# Patient Record
Sex: MALE | Race: WHITE | NOT HISPANIC OR LATINO
[De-identification: names, ages, dates, MRNs, and addresses within clinical notes are randomized per-mention and may not be internally consistent; named-entity substitution may affect disease eponyms.]

---

## 2020-09-22 PROBLEM — Z00.00 ENCOUNTER FOR PREVENTIVE HEALTH EXAMINATION: Status: ACTIVE | Noted: 2020-09-22

## 2020-09-24 DIAGNOSIS — Z01.818 ENCOUNTER FOR OTHER PREPROCEDURAL EXAMINATION: ICD-10-CM

## 2020-09-25 ENCOUNTER — APPOINTMENT (OUTPATIENT)
Dept: DISASTER EMERGENCY | Facility: CLINIC | Age: 45
End: 2020-09-25

## 2020-09-26 LAB — SARS-COV-2 N GENE NPH QL NAA+PROBE: NOT DETECTED

## 2020-09-29 ENCOUNTER — OUTPATIENT (OUTPATIENT)
Dept: OUTPATIENT SERVICES | Facility: HOSPITAL | Age: 45
LOS: 1 days | End: 2020-09-29

## 2021-07-13 ENCOUNTER — OUTPATIENT (OUTPATIENT)
Dept: OUTPATIENT SERVICES | Facility: HOSPITAL | Age: 46
LOS: 1 days | End: 2021-07-13

## 2021-09-16 ENCOUNTER — FORM ENCOUNTER (OUTPATIENT)
Age: 46
End: 2021-09-16

## 2021-09-17 ENCOUNTER — TRANSCRIPTION ENCOUNTER (OUTPATIENT)
Age: 46
End: 2021-09-17

## 2021-09-19 ENCOUNTER — OUTPATIENT (OUTPATIENT)
Dept: OUTPATIENT SERVICES | Facility: HOSPITAL | Age: 46
LOS: 1 days | End: 2021-09-19
Payer: COMMERCIAL

## 2021-09-19 ENCOUNTER — APPOINTMENT (OUTPATIENT)
Dept: DISASTER EMERGENCY | Facility: HOSPITAL | Age: 46
End: 2021-09-19

## 2021-09-19 VITALS
OXYGEN SATURATION: 98 % | WEIGHT: 225.09 LBS | DIASTOLIC BLOOD PRESSURE: 90 MMHG | RESPIRATION RATE: 16 BRPM | SYSTOLIC BLOOD PRESSURE: 145 MMHG | HEART RATE: 73 BPM | HEIGHT: 66 IN | TEMPERATURE: 99 F

## 2021-09-19 VITALS
RESPIRATION RATE: 16 BRPM | OXYGEN SATURATION: 98 % | DIASTOLIC BLOOD PRESSURE: 97 MMHG | TEMPERATURE: 98 F | SYSTOLIC BLOOD PRESSURE: 134 MMHG | HEART RATE: 56 BPM

## 2021-09-19 DIAGNOSIS — U07.1 COVID-19: ICD-10-CM

## 2021-09-19 PROCEDURE — M0243: CPT

## 2021-09-19 RX ORDER — SODIUM CHLORIDE 9 MG/ML
250 INJECTION INTRAMUSCULAR; INTRAVENOUS; SUBCUTANEOUS
Refills: 0 | Status: COMPLETED | OUTPATIENT
Start: 2021-09-19 | End: 2021-09-19

## 2021-09-19 RX ADMIN — SODIUM CHLORIDE 310 MILLILITER(S): 9 INJECTION INTRAMUSCULAR; INTRAVENOUS; SUBCUTANEOUS at 08:49

## 2021-09-19 NOTE — CHART NOTE - NSCHARTNOTEFT_GEN_A_CORE
CC: Monoclonal Antibody Infusions /COVID 19 Positive      History: Patient presents for infusion of monoclonal antibody infusion. Patient has been screened and was deemed to be a candidate.    Symptoms/ Criteria: + covid    Risk Profile includes: asthma htn    casirivimab/imdevimab in sodium chloride 0.9% (EUA) IVPB 100 milliLiter(s) IV Intermittent once  sodium chloride 0.9%. 250 milliLiter(s) IV Continuous <Continuous>      PMHx:  Infection due to severe acute respiratory syndrome coronavirus 2 (SARS-CoV-2)          T(C): 37.4 (09-19-21 @ 07:49), Max: 37.4 (09-19-21 @ 07:49)  HR: 73 (09-19-21 @ 07:49) (73 - 73)  BP: 145/90 (09-19-21 @ 07:49) (145/90 - 145/90)  RR: 16 (09-19-21 @ 07:49) (16 - 16)  SpO2: 98% (09-19-21 @ 07:49) (98% - 98%)      PE:   Appearance: NAD	  HEENT:   Normal oral mucosa.   Lymphatic: No lymphadenopathy  Cardiovascular: Normal S1 S2, No JVD, No murmurs, No edema  Respiratory: Lungs clear to auscultation	  Gastrointestinal:  Soft, Non-tender. No guarding   Skin: warm and dry  Neurologic: Non-focal  Extremities: Normal range of motion.     ASSESSMENT:  Pt is a 46y year old Male Covid +  referred to the infusion center for Monoclonal antibody infusion (Regeneron).        PLAN:  - infusion procedure explained to patient   - Consent for monoclonal antibody infusion obtained   - Risk & benefits discussed/all questions answered  - infuse Regeneron over one hour   - will observe patient for one hour post infusion  and then if stable discharge home with outpt follow up as planned by PMD.    POST INFUSION ASSESSMENT:   DISCHARGE at approximately  XXX  hours    - Patient tolerated infusion well denies complaints of chest pain/SOB/dizziness/ palps  - VSS for discharge home  - D/C instructions given/ fact sheet included.  - Patient to follow-up with PCP as needed. CC: Monoclonal Antibody Infusions /COVID 19 Positive      History: Patient presents for infusion of monoclonal antibody infusion. Patient has been screened and was deemed to be a candidate.    Symptoms/ Criteria: + covid    Risk Profile includes: asthma htn    casirivimab/imdevimab in sodium chloride 0.9% (EUA) IVPB 100 milliLiter(s) IV Intermittent once  sodium chloride 0.9%. 250 milliLiter(s) IV Continuous <Continuous>      PMHx:  Infection due to severe acute respiratory syndrome coronavirus 2 (SARS-CoV-2)          T(C): 37.4 (09-19-21 @ 07:49), Max: 37.4 (09-19-21 @ 07:49)  HR: 73 (09-19-21 @ 07:49) (73 - 73)  BP: 145/90 (09-19-21 @ 07:49) (145/90 - 145/90)  RR: 16 (09-19-21 @ 07:49) (16 - 16)  SpO2: 98% (09-19-21 @ 07:49) (98% - 98%)      PE:   Appearance: NAD	  HEENT:   Normal oral mucosa.   Lymphatic: No lymphadenopathy  Cardiovascular: Normal S1 S2, No JVD, No murmurs, No edema  Respiratory: Lungs clear to auscultation	  Gastrointestinal:  Soft, Non-tender. No guarding   Skin: warm and dry  Neurologic: Non-focal  Extremities: Normal range of motion.     ASSESSMENT:  Pt is a 46y year old Male Covid +  referred to the infusion center for Monoclonal antibody infusion (Regeneron).        PLAN:  - infusion procedure explained to patient   - Consent for monoclonal antibody infusion obtained   - Risk & benefits discussed/all questions answered  - infuse Regeneron over one hour   - will observe patient for one hour post infusion  and then if stable discharge home with outpt follow up as planned by PMD.    POST INFUSION ASSESSMENT:   DISCHARGE at approximately  1010a  hours    - Patient tolerated infusion well denies complaints of chest pain/SOB/dizziness/ palps  - VSS for discharge home  - D/C instructions given/ fact sheet included.  - Patient to follow-up with PCP as needed.

## 2021-09-21 ENCOUNTER — TRANSCRIPTION ENCOUNTER (OUTPATIENT)
Age: 46
End: 2021-09-21

## 2022-10-09 ENCOUNTER — TRANSCRIPTION ENCOUNTER (OUTPATIENT)
Age: 47
End: 2022-10-09

## 2023-02-10 ENCOUNTER — NON-APPOINTMENT (OUTPATIENT)
Age: 48
End: 2023-02-10

## 2024-02-29 ENCOUNTER — APPOINTMENT (OUTPATIENT)
Dept: CARDIOLOGY | Facility: CLINIC | Age: 49
End: 2024-02-29
Payer: COMMERCIAL

## 2024-02-29 ENCOUNTER — NON-APPOINTMENT (OUTPATIENT)
Age: 49
End: 2024-02-29

## 2024-02-29 VITALS — DIASTOLIC BLOOD PRESSURE: 80 MMHG | SYSTOLIC BLOOD PRESSURE: 126 MMHG

## 2024-02-29 VITALS
WEIGHT: 193 LBS | DIASTOLIC BLOOD PRESSURE: 84 MMHG | BODY MASS INDEX: 31.02 KG/M2 | HEART RATE: 76 BPM | SYSTOLIC BLOOD PRESSURE: 120 MMHG | HEIGHT: 66 IN

## 2024-02-29 DIAGNOSIS — Z78.9 OTHER SPECIFIED HEALTH STATUS: ICD-10-CM

## 2024-02-29 DIAGNOSIS — Z86.69 PERSONAL HISTORY OF OTHER DISEASES OF THE NERVOUS SYSTEM AND SENSE ORGANS: ICD-10-CM

## 2024-02-29 DIAGNOSIS — Z13.6 ENCOUNTER FOR SCREENING FOR CARDIOVASCULAR DISORDERS: ICD-10-CM

## 2024-02-29 DIAGNOSIS — K21.9 GASTRO-ESOPHAGEAL REFLUX DISEASE W/OUT ESOPHAGITIS: ICD-10-CM

## 2024-02-29 DIAGNOSIS — I10 ESSENTIAL (PRIMARY) HYPERTENSION: ICD-10-CM

## 2024-02-29 PROCEDURE — 93000 ELECTROCARDIOGRAM COMPLETE: CPT

## 2024-02-29 PROCEDURE — 99203 OFFICE O/P NEW LOW 30 MIN: CPT

## 2024-02-29 RX ORDER — OMEPRAZOLE 20 MG/1
20 CAPSULE, DELAYED RELEASE ORAL
Qty: 30 | Refills: 0 | Status: ACTIVE | COMMUNITY

## 2024-02-29 RX ORDER — METOPROLOL SUCCINATE 100 MG/1
100 TABLET, EXTENDED RELEASE ORAL
Qty: 90 | Refills: 3 | Status: ACTIVE | COMMUNITY

## 2024-02-29 NOTE — PHYSICAL EXAM
[Normal] : moves all extremities, no focal deficits, normal speech [de-identified] :  No carotid bruits auscultated bilaterally.

## 2024-02-29 NOTE — HISTORY OF PRESENT ILLNESS
[FreeTextEntry1] : 49 year old male with PMHx of HTN presents for a cardiac evaluation out of the recommendation of his wife. Patient with no chest pain, dyspnea or palpitations. Patient has been on Toprol XL 100mg daily for about  3 years, but is interested in coming off of it as he states his BP is always normal when he checks it.  There is no history of MI, CVA, CHF, or previous coronary intervention.  There is no known family history in first degree relatives of cardiovascular disease.

## 2024-02-29 NOTE — DISCUSSION/SUMMARY
[FreeTextEntry1] : 1. HTN: Patient has been on Toprol XL 100mg daily for about  3 years, but is interested in coming off of it as he states his BP is always normal when he checks it. I explained to patient that he has to wean himself off Toprol XL over the course of two weeks. Once off the medication he will check his BP and keep a diary.   Ordered CT of Heart for Calcium Scoring.  Follow up in 2 months. [EKG obtained to assist in diagnosis and management of assessed problem(s)] : EKG obtained to assist in diagnosis and management of assessed problem(s)

## 2024-04-25 ENCOUNTER — APPOINTMENT (OUTPATIENT)
Dept: CARDIOLOGY | Facility: CLINIC | Age: 49
End: 2024-04-25

## 2025-06-27 NOTE — CHART NOTE - NSCHARTNOTESELECT_GEN_ALL_CORE
Patient: Harmeet Neves (66 year old, male) Referring Provider:  Insurance:   Diagnosis: Upper back pain (M54.9)  Left knee pain (M25.562) Adam Branham  [facility]   Date of Surgery: n/a Next MD visit:  N/A   Precautions:  Hearing Impairment (Language Barrier) No data recorded Referral Information:    Date of Evaluation: Req: 8, Auth: 8, Exp: 8/31/2025 06/09/25 POC Auth Visits:  8       Today's Date   6/27/2025    Subjective  Pt reports knee is 75% resolved & back is 60% resolved.       Pain: 0/10     Objective  see flowsheet for details       Assessment  Pt req less manual cues for form today. Cont to incorporate both strengthening & stretching/ mobility work.    Goals (to be met in 8 visits)      Not Met Progress Toward Partially Met Met   Pt will improve L hip Abduction strength to 4/5 to ascend 1 flight of stairs reciprocally without UE assist. [] [] [] []   Pt will improve lumbar spine AROM flexion to Min restricted or better to allow increase ease with bending forward for lifting from floor. [] [] [] []   Pt will report pain at worst 3/10 w/ stand->sit transfers. [] [] [] []   Pt will demo improved standing posture w/o VC's or manual cues for improved body mechanics to report ability to stand >60 minutes without pain for home activities. [] [] [] []   Pt will be independent and compliant with comprehensive HEP to maintain progress achieved in PT [] [] [] []                Plan  Progress towards discharge.    Treatment Last 4 Visits  Treatment Day: 6 6/9/2025 6/13/2025 6/25/2025 6/27/2025   Spine Treatment   Therapeutic Exercise HEP instruction NuStep L3 X 5'  Standing:  -doorway pec stretch 4 X 30\" B  Seated:  -trunk rot stretch 5\" X 10 R/L  -scap retractions X 20 w/ min A  Supine:  -LTR 5\" X 3' total  -hooklying active HS stretch 5\" X 10 R/L  -hooklying hip Abd diana Green pilates ring 5\" X 20  -crossover piriformis stretch 2 X 30\" R/L  S/L:  -clams X 20 R/L NuStep L5 X 5'  Standing:  -B  heel raises x 30  -B toes raises x 30  -hip Abd w/ YTB loop X 20 R/L  -hip Ext w/ YTB loop X 20 R/L  -CW //bars X 4 rounds YTB loop  -Fwd MW //bars X 2 rounds YTB loop  -sport cord: row X 20 Red; shld ext X 20 Red, shld horiz Abd X 20 Red  Supine:  -hip Abd diana black pilates ring->Blue TB X 30  -hip Add diana yellow ball X 30  -LTR X 20  -L SLR X 20  -bridge X 20  -L HS stretch w/ strap 3 X 30\" NuStep L5 X 6'  Seated:  -SB lumbar flex, SB R/L stretch 10\" X 10 ea  -trunk/ thoracic ext stretch w/ yellow ball anchor, HBH to support neck; X 20  Standing:  -SB walkup w/ end range stretch X 20  -doorway pec stretches: arms down, arms up B 10\" X 10 ea  -flat back stretch //bars 3 X 30\"  -sport cord: row X 20 Green; shld ext X 20 Red  -hip Flex w/ YTB loop X 30 R/L  -hip Abd w/ YTB loop X 30 R/L  -hip Ext w/ YTB loop X 30 R/L  -CW //bars X 6 rounds YTB loop  -Fwd MW //bars X 4 rounds YTB loop   Therapeutic Exercise Minutes 9 38 40 40   Evaluation Minutes 36      Total Time Of Timed Procedures 9 38 40 40   Total Time Of Service-Based Procedures 36 0 0 0   Total Treatment Time 45 38 40 40   HEP Access Code: [email or web address]  URL: [email or web address]  Date: 06/09/2025  Prepared by: Glo Brewer    Exercises  - Doorway Pec Stretch at 90 Degrees Abduction  - 2 x daily - 7 x weekly - 4 sets - 30 sec hold  - Seated Trunk Rotation - Arms Crossed  - 2 x daily - 7 x weekly - 1 sets - 10 reps - 5 sec hold  - Supine Lower Trunk Rotation  - 2 x daily - 7 x weekly - 1 sets - 10 reps - 5 sec hold  - Hooklying Hamstring Stretch  - 2 x daily - 7 x weekly - 1 sets - 10 reps - 5 sec hold           HEP  Access Code: [email or web address]  URL: [email or web address]  Date: 06/09/2025  Prepared by: Glo Brewer    Exercises  - Doorway Pec Stretch at 90 Degrees Abduction  - 2 x daily - 7 x weekly - 4 sets - 30 sec hold  - Seated Trunk Rotation - Arms Crossed  - 2 x daily - 7 x weekly - 1 sets - 10 reps - 5 sec hold  - Supine Lower  COVID Monoclonal Antibody Trunk Rotation  - 2 x daily - 7 x weekly - 1 sets - 10 reps - 5 sec hold  - Hooklying Hamstring Stretch  - 2 x daily - 7 x weekly - 1 sets - 10 reps - 5 sec hold    Charges  3TE